# Patient Record
Sex: FEMALE | Race: WHITE
[De-identification: names, ages, dates, MRNs, and addresses within clinical notes are randomized per-mention and may not be internally consistent; named-entity substitution may affect disease eponyms.]

---
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
## 2020-02-07 ENCOUNTER — APPOINTMENT (OUTPATIENT)
Dept: PEDIATRIC ADOLESCENT MEDICINE | Facility: CLINIC | Age: 20
End: 2020-02-07
Payer: COMMERCIAL

## 2020-02-07 VITALS — WEIGHT: 117.5 LBS | HEART RATE: 86 BPM | DIASTOLIC BLOOD PRESSURE: 81 MMHG | SYSTOLIC BLOOD PRESSURE: 127 MMHG

## 2020-02-07 DIAGNOSIS — Z30.41 ENCOUNTER FOR SURVEILLANCE OF CONTRACEPTIVE PILLS: ICD-10-CM

## 2020-02-07 PROCEDURE — 99213 OFFICE O/P EST LOW 20 MIN: CPT | Mod: NC

## 2020-02-07 RX ORDER — NORGESTIMATE AND ETHINYL ESTRADIOL 7DAYSX3 28
0.18/0.215/0.25 KIT ORAL DAILY
Qty: 10 | Refills: 0 | Status: ACTIVE | OUTPATIENT
Start: 2020-02-07

## 2020-02-07 NOTE — RISK ASSESSMENT
[Grade: ____] : Grade: [unfilled] [Eats regular meals including adequate fruits and vegetables] : eats regular meals including adequate fruits and vegetables [Has friends] : has friends [Home is free of violence] : home is free of violence [Has peer relationships free of violence] : has peer relationships free of violence [Displays self-confidence] : displays self-confidence [Uses tobacco] : does not use tobacco [Drinks alcohol] : does not drink alcohol [Uses drugs] : does not use drugs  [de-identified] : no concerns about school [de-identified] : Lives at school on campus, feels safe, has adults and peers there that support her [Gets depressed, anxious, or irritable/has mood swings] : does not get depressed, anxious, or irritable/has mood swings [de-identified] : Sexually active currently with 1 male partner, inconsistent condom use. [de-identified] : Denies changes in eating patterns, denies intentional dieting [de-identified] : Reports mood is good

## 2020-02-07 NOTE — DISCUSSION/SUMMARY
[FreeTextEntry1] : Pt is a 18 yo girl presenting for a refill of birth control pills. She has been taking an OCP for a number of years without any concerns, and is currently sexually active with one male partner.  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 Review of systems and physical exam is negative for any OCP side effects. No concerns in her social history.\par \par Plan:\par 1) OCP for 10 months, to cover her entire deployment\par 2) follow-up when she returns

## 2020-02-07 NOTE — REVIEW OF SYSTEMS
[Change in Weight] : change in weight [Vaginal Dischage] : vaginal discharge [Negative] : Heme/Lymph [Malaise] : no malaise [Chills] : no chills [Fever] : no fever [Night Sweats] : no night sweats [Headache] : no headache [Itchy Eyes] : no itchy eyes [Eye Discharge] : no eye discharge [Ear Pain] : no ear pain [Nasal Discharge] : no nasal discharge [Changes in Vision] : no changes in vision [Sore Throat] : no sore throat [Nasal Congestion] : no nasal congestion [Edema] : no edema [Chest Pain] : no chest pain [Intolerance to Exercise] : tolerance to exercise [Cough] : no cough [Wheezing] : no wheezing [Congestion] : no congestion [Shortness of Breath] : no shortness of breath [PO Intolerance] : PO tolerance [Appetite Changes] : no appetite changes [Abdominal Pain] : no abdominal pain [Weakness] : no weakness [Restriction of Motion] : no restriction of motion [Swelling of Joint] : no swelling of joint [Changes in Gait] : no changes in gait [Rash] : no rash [Polyuria] : no polyuria [Dysuria] : no dysuria [Irregular Menstrual Cycle] : no irregular menstrual cycle [Irregular Vaginal Bleeding] : no irregular vaginal bleeding [FreeTextEntry1] : normal vaginal discharge [Vaginal Pain] : no vaginal pain

## 2020-02-07 NOTE — HISTORY OF PRESENT ILLNESS
[FreeTextEntry6] : Pt is a 20 yo girl presenting for renewal of birth control pills.\par \par Been on OCP 3.5 years, never experienced any problems. Reports consistent pill use, doesn't forget to take them.\par Last period started 5 days ago, completing today (today is day 6). Noticed "lots of discharge" throughout the month, nothing concerning, thinks she's always had it. No burning, no itching, no odor, colorless discharge. 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 Partner says he was a virgin, and she is not interested in STI testing today.\par  \par Denies headaches, leg pain or swelling, cough/difficulty breathing, abdominal pain, or changes in GI function. Reports mood has been good lately, more regular since being on the pill. No concerns about sexual function.

## 2020-02-07 NOTE — PHYSICAL EXAM
[No Acute Distress] : no acute distress [EOMI] : EOMI [Alert] : alert [Normocephalic] : normocephalic [Pink Nasal Mucosa] : pink nasal mucosa [Nonerythematous Oropharynx] : nonerythematous oropharynx [Supple] : supple [Nontender Cervical Lymph Nodes] : nontender cervical lymph nodes [Regular Rate and Rhythm] : regular rate and rhythm [Clear to Auscultation Bilaterally] : clear to auscultation bilaterally [Soft] : soft [Normal S1, S2 audible] : normal S1, S2 audible [No Murmurs] : no murmurs [NonTender] : non tender [Normal Bowel Sounds] : normal bowel sounds [Non Distended] : non distended [Capillary Refill <2s] : capillary refill < 2s [Warm, Well Perfused x4] : warm, well perfused x4 [No Abnormal Lymph Nodes Palpated] : no abnormal lymph nodes palpated [Warm] : warm [Moves All Extremities x 4] : moves all extremities x4 [de-identified] : mild blanching erythematous splotches on neck  [FreeTextEntry1] : Interactive, engaged, well-appearing

## 2021-11-30 ENCOUNTER — OFFICE VISIT (OUTPATIENT)
Dept: URGENT CARE | Facility: CLINIC | Age: 21
End: 2021-11-30
Payer: COMMERCIAL

## 2021-11-30 VITALS
OXYGEN SATURATION: 99 % | HEART RATE: 87 BPM | SYSTOLIC BLOOD PRESSURE: 149 MMHG | HEIGHT: 66 IN | DIASTOLIC BLOOD PRESSURE: 77 MMHG | WEIGHT: 120 LBS | BODY MASS INDEX: 19.29 KG/M2 | TEMPERATURE: 99 F | RESPIRATION RATE: 18 BRPM

## 2021-11-30 DIAGNOSIS — R10.9 FLANK PAIN: Primary | ICD-10-CM

## 2021-11-30 LAB
BILIRUB UR QL STRIP: NEGATIVE
GLUCOSE UR QL STRIP: NEGATIVE
KETONES UR QL STRIP: POSITIVE
LEUKOCYTE ESTERASE UR QL STRIP: NEGATIVE
PH, POC UA: 5.5 (ref 5–8)
POC BLOOD, URINE: NEGATIVE
POC NITRATES, URINE: NEGATIVE
PROT UR QL STRIP: NEGATIVE
SP GR UR STRIP: 1.02 (ref 1–1.03)
UROBILINOGEN UR STRIP-ACNC: NORMAL (ref 0.1–1.1)

## 2021-11-30 PROCEDURE — 96372 PR INJECTION,THERAP/PROPH/DIAG2ST, IM OR SUBCUT: ICD-10-PCS | Mod: S$GLB,,, | Performed by: PHYSICIAN ASSISTANT

## 2021-11-30 PROCEDURE — 96372 THER/PROPH/DIAG INJ SC/IM: CPT | Mod: S$GLB,,, | Performed by: PHYSICIAN ASSISTANT

## 2021-11-30 PROCEDURE — 81003 POCT URINALYSIS, DIPSTICK, AUTOMATED, W/O SCOPE: ICD-10-PCS | Mod: QW,S$GLB,, | Performed by: PHYSICIAN ASSISTANT

## 2021-11-30 PROCEDURE — 74019 RADEX ABDOMEN 2 VIEWS: CPT | Mod: FY,S$GLB,, | Performed by: RADIOLOGY

## 2021-11-30 PROCEDURE — 99203 PR OFFICE/OUTPT VISIT, NEW, LEVL III, 30-44 MIN: ICD-10-PCS | Mod: 25,S$GLB,, | Performed by: PHYSICIAN ASSISTANT

## 2021-11-30 PROCEDURE — 74019 XR ABDOMEN FLAT AND ERECT: ICD-10-PCS | Mod: FY,S$GLB,, | Performed by: RADIOLOGY

## 2021-11-30 PROCEDURE — 99203 OFFICE O/P NEW LOW 30 MIN: CPT | Mod: 25,S$GLB,, | Performed by: PHYSICIAN ASSISTANT

## 2021-11-30 PROCEDURE — 81003 URINALYSIS AUTO W/O SCOPE: CPT | Mod: QW,S$GLB,, | Performed by: PHYSICIAN ASSISTANT

## 2021-11-30 RX ORDER — KETOROLAC TROMETHAMINE 10 MG/1
10 TABLET, FILM COATED ORAL EVERY 6 HOURS PRN
Qty: 10 TABLET | Refills: 0 | Status: SHIPPED | OUTPATIENT
Start: 2021-11-30 | End: 2021-12-07

## 2021-11-30 RX ORDER — TAMSULOSIN HYDROCHLORIDE 0.4 MG/1
0.4 CAPSULE ORAL DAILY
Qty: 10 CAPSULE | Refills: 0 | Status: SHIPPED | OUTPATIENT
Start: 2021-11-30 | End: 2021-12-10

## 2021-11-30 RX ORDER — CIPROFLOXACIN 500 MG/1
500 TABLET ORAL 2 TIMES DAILY
Qty: 10 TABLET | Refills: 0 | Status: SHIPPED | OUTPATIENT
Start: 2021-11-30 | End: 2021-12-05

## 2021-11-30 RX ORDER — KETOROLAC TROMETHAMINE 30 MG/ML
30 INJECTION, SOLUTION INTRAMUSCULAR; INTRAVENOUS
Status: COMPLETED | OUTPATIENT
Start: 2021-11-30 | End: 2021-11-30

## 2021-11-30 RX ADMIN — KETOROLAC TROMETHAMINE 30 MG: 30 INJECTION, SOLUTION INTRAMUSCULAR; INTRAVENOUS at 07:11

## 2022-02-02 ENCOUNTER — RESULT REVIEW (OUTPATIENT)
Age: 22
End: 2022-02-02